# Patient Record
Sex: FEMALE | Race: WHITE | NOT HISPANIC OR LATINO | URBAN - METROPOLITAN AREA
[De-identification: names, ages, dates, MRNs, and addresses within clinical notes are randomized per-mention and may not be internally consistent; named-entity substitution may affect disease eponyms.]

---

## 2022-07-01 ENCOUNTER — OFFICE VISIT (OUTPATIENT)
Dept: URBAN - METROPOLITAN AREA CLINIC 81 | Facility: CLINIC | Age: 52
End: 2022-07-01
Payer: COMMERCIAL

## 2022-07-01 DIAGNOSIS — H52.4 PRESBYOPIA: ICD-10-CM

## 2022-07-01 DIAGNOSIS — H04.123 DRY EYE SYNDROME OF BILATERAL LACRIMAL GLANDS: Primary | ICD-10-CM

## 2022-07-01 PROCEDURE — 92002 INTRM OPH EXAM NEW PATIENT: CPT | Performed by: OPTOMETRIST

## 2022-07-01 RX ORDER — FLUOROMETHOLONE 1 MG/ML
0.1 % SOLUTION/ DROPS OPHTHALMIC
Qty: 5 | Refills: 1 | Status: ACTIVE
Start: 2022-07-01

## 2022-07-01 ASSESSMENT — KERATOMETRY
OD: 43.63
OS: 43.63

## 2022-07-01 ASSESSMENT — VISUAL ACUITY
OD: 20/20
OS: 20/25

## 2022-07-01 ASSESSMENT — INTRAOCULAR PRESSURE
OD: 18
OS: 18

## 2022-07-01 NOTE — IMPRESSION/PLAN
Impression: Dry eye syndrome of bilateral lacrimal glands: Q53.882. w/ allergic overlay. Plan: Discussed diagnosis in detail with patient. Dry eye accounts for the patient's symptoms. Dry eye is a chronic condition and does not have a cure and will need artificial tears for maintenance. Recommend Systane Complete or Refresh Relieva OU QID longterm. Ok to dispense samples. Start FML QID OU x 1 week then BID x 1 week. Glaucoma precautions reviewed. Pt to return if symptoms worsen. Monitor for changes.

## 2022-09-22 ENCOUNTER — OFFICE VISIT (OUTPATIENT)
Dept: URBAN - METROPOLITAN AREA CLINIC 81 | Facility: CLINIC | Age: 52
End: 2022-09-22
Payer: COMMERCIAL

## 2022-09-22 DIAGNOSIS — H04.123 DRY EYE SYNDROME OF BILATERAL LACRIMAL GLANDS: Primary | ICD-10-CM

## 2022-09-22 DIAGNOSIS — H10.45 OTHER CHRONIC ALLERGIC CONJUNCTIVITIS: ICD-10-CM

## 2022-09-22 PROCEDURE — 99213 OFFICE O/P EST LOW 20 MIN: CPT | Performed by: OPTOMETRIST

## 2022-09-22 RX ORDER — PREDNISOLONE ACETATE 10 MG/ML
1 % SUSPENSION/ DROPS OPHTHALMIC
Qty: 5 | Refills: 1 | Status: ACTIVE
Start: 2022-09-22

## 2022-09-22 RX ORDER — OLOPATADINE HYDROCHLORIDE OPHTHALMIC 1 MG/ML
0.1 % SOLUTION/ DROPS OPHTHALMIC
Qty: 5 | Refills: 6 | Status: ACTIVE
Start: 2022-09-22

## 2022-09-22 ASSESSMENT — INTRAOCULAR PRESSURE
OD: 16
OS: 16

## 2022-09-22 NOTE — IMPRESSION/PLAN
Impression: Dry eye syndrome of bilateral lacrimal glands: H04.123. w/ allergic overlay.
-no significant improvement with FML drop
-affecting daily life Plan: Discussed diagnosis in detail with patient. Dry eye accounts for the patient's symptoms. Dry eye is a chronic condition and does not have a cure and will need artificial tears for maintenance. Recommend Systane Complete or Refresh Relieva OU BID-QID longterm. Start Prednisolone OU QID x 1 week with weekly taper. Glaucoma precautions reviewed. Pt to return if symptoms worsen. Monitor for changes. If NB at f/up recommend punctal plugs or Restasis.

## 2022-09-22 NOTE — IMPRESSION/PLAN
Impression: Other chronic allergic conjunctivitis: H10.45. Bilateral. Plan: Discussed diagnosis with patient in detail. Start Olopatadine OU BID, OTC option of Pataday, can call for substitute if Olopatadine not covered by insurance. Will continue to observe condition and/or symptoms. Patient instructed to call if condition gets worse.

## 2022-10-20 ENCOUNTER — OFFICE VISIT (OUTPATIENT)
Dept: URBAN - METROPOLITAN AREA CLINIC 81 | Facility: CLINIC | Age: 52
End: 2022-10-20
Payer: COMMERCIAL

## 2022-10-20 DIAGNOSIS — H04.123 DRY EYE SYNDROME OF BILATERAL LACRIMAL GLANDS: Primary | ICD-10-CM

## 2022-10-20 DIAGNOSIS — H01.8 OTHER SPECIFIED INFLAMMATIONS OF EYELID: ICD-10-CM

## 2022-10-20 DIAGNOSIS — H10.45 OTHER CHRONIC ALLERGIC CONJUNCTIVITIS: ICD-10-CM

## 2022-10-20 PROCEDURE — 99214 OFFICE O/P EST MOD 30 MIN: CPT | Performed by: OPTOMETRIST

## 2022-10-20 RX ORDER — LOSARTAN POTASSIUM 100 MG/1
100 MG TABLET, FILM COATED ORAL
Qty: 0 | Refills: 0 | Status: ACTIVE
Start: 2022-10-20

## 2022-10-20 RX ORDER — NEOMYCIN SULFATE, POLYMYXIN B SULFATE AND DEXAMETHASONE 3.5; 10000; 1 MG/ML; [USP'U]/ML; MG/ML
SUSPENSION OPHTHALMIC
Qty: 5 | Refills: 1 | Status: ACTIVE
Start: 2022-10-20

## 2022-10-20 RX ORDER — OLOPATADINE HYDROCHLORIDE OPHTHALMIC 1 MG/ML
0.1 % SOLUTION/ DROPS OPHTHALMIC
Qty: 5 | Refills: 6 | Status: ACTIVE
Start: 2022-10-20

## 2022-10-20 ASSESSMENT — INTRAOCULAR PRESSURE
OS: 18
OD: 19

## 2022-10-20 NOTE — IMPRESSION/PLAN
Impression: Dry eye syndrome of bilateral lacrimal glands: H04.123. w/ allergic overlay.
-no improvement with FML gtt and Prednisolone gtt
-affecting daily life
-Thyroid related? dry, redness, eyelids feeling heavy Plan: Discussed diagnosis in detail with patient. Dry eye accounts for the patient's symptoms. Dry eye is a chronic condition and does not have a cure and will need artificial tears for maintenance. Recommend Systane Complete or Refresh Relieva OU BID-QID longterm. D/C Prednisolone gtt. Glaucoma precautions reviewed. See blepharitis plan. Pt to return if symptoms worsen. Monitor for changes. If NB at f/up recommend punctal plugs or Restasis. Recommend following up with PCP to monitor thyroid.

## 2022-10-20 NOTE — IMPRESSION/PLAN
Impression: Other specified inflammations of eyelid: H01.8. Bilateral. Plan: Blepharitis accounts for the patient's symptoms. Lid scrubs with diluted Mat Fuchs and Miah tear free baby shampoo as directed or Ocusoft lid scrubs allergy and monitor PRN. Start Maxitrol OU QID x 7 days then BID x 7 days.

## 2022-10-20 NOTE — IMPRESSION/PLAN
Impression: Other chronic allergic conjunctivitis: H10.45. Bilateral.
-no improvement Plan: Discussed diagnosis with patient in detail. Continue Olopatadine OU PRN. Will continue to observe condition and/or symptoms. Patient instructed to call if condition gets worse.

## 2022-11-03 ENCOUNTER — OFFICE VISIT (OUTPATIENT)
Dept: URBAN - METROPOLITAN AREA CLINIC 81 | Facility: CLINIC | Age: 52
End: 2022-11-03
Payer: COMMERCIAL

## 2022-11-03 DIAGNOSIS — H04.123 DRY EYE SYNDROME OF BILATERAL LACRIMAL GLANDS: Primary | ICD-10-CM

## 2022-11-03 DIAGNOSIS — H01.8 OTHER SPECIFIED INFLAMMATIONS OF EYELID: ICD-10-CM

## 2022-11-03 PROCEDURE — 99213 OFFICE O/P EST LOW 20 MIN: CPT | Performed by: OPTOMETRIST

## 2022-11-03 ASSESSMENT — INTRAOCULAR PRESSURE
OS: 22
OD: 22

## 2022-11-03 NOTE — IMPRESSION/PLAN
Impression: Other specified inflammations of eyelid: H01.8. Bilateral.
-mild improvement with Ocusoft lid scrubs Plan: Blepharitis accounts for the patient's symptoms. Lid scrubs Ocusoft lid scrubs allergy or trial Avenova spray and monitor PRN. Continue Maxitrol gtt OU QD x 5 days and D/C.

## 2022-11-03 NOTE — IMPRESSION/PLAN
Impression: Dry eye syndrome of bilateral lacrimal glands: H04.123. w/ allergic overlay.
-mild improvement with Maxitrol gtt
-no improvement with FML gtt and Prednisolone gtt
-affecting daily life
-Thyroid related? blood work performed and WNL
-Placed, plugs BLL. OD [place] size med 0.6-0.85mm log [id number] and OS [place] size Med 0.6-0.85 lot [id number]. Plan: Discussed diagnosis in detail with patient. Dry eye accounts for the patient's symptoms. Dry eye is a chronic condition and does not have a cure and will need artificial tears for maintenance. Recommend Refresh Digital or Refresh Relieva OU BID-QID longterm (samples dispensed). D/C Prednisolone gtt. Steroid precautions reviewed. See blepharitis plan. Pt to return if symptoms worsen. Monitor for changes. Recommend punctal plugs, patient agrees, verbal consent, place plug BLL today without complications, patient left the office in excellent condition.

## 2022-12-14 ENCOUNTER — OFFICE VISIT (OUTPATIENT)
Dept: URBAN - METROPOLITAN AREA CLINIC 81 | Facility: CLINIC | Age: 52
End: 2022-12-14
Payer: COMMERCIAL

## 2022-12-14 DIAGNOSIS — H04.123 DRY EYE SYNDROME OF BILATERAL LACRIMAL GLANDS: Primary | ICD-10-CM

## 2022-12-14 DIAGNOSIS — H10.45 OTHER CHRONIC ALLERGIC CONJUNCTIVITIS: ICD-10-CM

## 2022-12-14 DIAGNOSIS — H01.8 OTHER SPECIFIED INFLAMMATIONS OF EYELID: ICD-10-CM

## 2022-12-14 PROCEDURE — 99213 OFFICE O/P EST LOW 20 MIN: CPT | Performed by: OPTOMETRIST

## 2022-12-14 RX ORDER — KETOTIFEN FUMARATE 0.35 MG/ML
SOLUTION/ DROPS OPHTHALMIC
Qty: 5 | Refills: 4 | Status: ACTIVE
Start: 2022-12-14

## 2022-12-14 ASSESSMENT — INTRAOCULAR PRESSURE
OD: 21
OS: 21

## 2022-12-14 NOTE — IMPRESSION/PLAN
Impression: Dry eye syndrome of bilateral lacrimal glands: H04.123. w/ allergic overlay.
-mild improvement in symptoms with punctal plugs
-mild improvement with Maxitrol gtt
-no improvement with FML gtt and Prednisolone gtt
-affecting daily life
-Thyroid related? blood work performed and WNL
-11/03/2022 OD [place] size med 0.6-0.85mm log [id number] and OS [place] size Med 0.6-0.85 lot [id number]. Plan: Discussed diagnosis in detail with patient. Dry eye accounts for the patient's symptoms. Dry eye is a chronic condition and does not have a cure and will need artificial tears for maintenance. Continue Refresh Relieva OU QID longterm. Monitor for changes.

## 2022-12-14 NOTE — IMPRESSION/PLAN
Impression: Other chronic allergic conjunctivitis: H10.45. Bilateral.
-no improvement with Olopatadine gtt Plan: Discussed diagnosis with patient in detail. Start Ketotifen (Zaditor or Alaway brand names) OU BID. Patient instructed to call if condition gets worse.

## 2022-12-14 NOTE — IMPRESSION/PLAN
Impression: Other specified inflammations of eyelid: H01.8. Bilateral.
 Plan: Blepharitis accounts for the patient's symptoms. Lid scrubs Ocusoft lid scrubs allergy.

## 2023-03-30 ENCOUNTER — OFFICE VISIT (OUTPATIENT)
Dept: URBAN - METROPOLITAN AREA CLINIC 81 | Facility: CLINIC | Age: 53
End: 2023-03-30
Payer: COMMERCIAL

## 2023-03-30 DIAGNOSIS — H10.45 OTHER CHRONIC ALLERGIC CONJUNCTIVITIS: ICD-10-CM

## 2023-03-30 DIAGNOSIS — H04.123 DRY EYE SYNDROME OF BILATERAL LACRIMAL GLANDS: Primary | ICD-10-CM

## 2023-03-30 PROCEDURE — 99214 OFFICE O/P EST MOD 30 MIN: CPT | Performed by: OPTOMETRIST

## 2023-03-30 RX ORDER — CYCLOSPORINE 0.5 MG/ML
0.05 % EMULSION OPHTHALMIC
Qty: 180 | Refills: 3 | Status: INACTIVE
Start: 2023-03-30 | End: 2024-03-28

## 2023-03-30 ASSESSMENT — INTRAOCULAR PRESSURE
OD: 16
OS: 17

## 2023-03-30 NOTE — IMPRESSION/PLAN
Impression: Dry eye syndrome of bilateral lacrimal glands: H04.123. w/ allergic overlay.
-mild improvement in symptoms with punctal plugs
-mild improvement with Maxitrol gtt
-no improvement with FML gtt and Prednisolone gtt
-affecting daily life
-Patient has had blood work performed, thyroid testing was normal
-03/2022 OD [place] size med 0.6-0.85mm log [id number] and OS [place] size Med 0.6-0.85 lot [id number]. Plan: Discussed diagnosis in detail with patient. Dry eye accounts for the patient's symptoms. Dry eye is a chronic condition and does not have a cure and will need artificial tears for maintenance. Continue Refresh Relieva OU QID longterm. Start Restasis OU BID, dry eye affecting daily life, failed conservative management with different brands of AT, Prednisolone, Maxitrol gtt, and punctal plugs. Monitor for changes.

## 2023-03-30 NOTE — IMPRESSION/PLAN
Impression: Other chronic allergic conjunctivitis: H10.45. Bilateral.
-no improvement with Olopatadine gtt or Ketotifen gtt Plan: Discussed, monitor and treat dry eye.

## 2023-06-22 ENCOUNTER — OFFICE VISIT (OUTPATIENT)
Dept: URBAN - METROPOLITAN AREA CLINIC 81 | Facility: CLINIC | Age: 53
End: 2023-06-22
Payer: COMMERCIAL

## 2023-06-22 DIAGNOSIS — H53.143 VISUAL DISCOMFORT, BILATERAL: ICD-10-CM

## 2023-06-22 DIAGNOSIS — H04.123 DRY EYE SYNDROME OF BILATERAL LACRIMAL GLANDS: Primary | ICD-10-CM

## 2023-06-22 PROCEDURE — 99213 OFFICE O/P EST LOW 20 MIN: CPT | Performed by: OPTOMETRIST

## 2023-06-22 ASSESSMENT — INTRAOCULAR PRESSURE
OS: 17
OD: 15

## 2023-06-22 NOTE — IMPRESSION/PLAN
Impression: Visual discomfort, bilateral: H53.143.
-Pt has seen Neurologist 
-Pt was given lots of meds by neuro but nothing helped
-Pt had botox done to help with migraines
-Pt had to get medical card to help with headaches
-Triggered by lights Plan: Discussed.  Recommend to follow up with PCP to consider consult with Neurologist.

## 2023-06-22 NOTE — IMPRESSION/PLAN
Impression: Dry eye syndrome of bilateral lacrimal glands: F12.875. w/ allergic overlay.
-6/22/23 punctual plug in the LLL OS, no plug in the OD
-Restasis not covered by ins 
-mild improvement in symptoms with punctal plugs
-mild improvement with Maxitrol gtt
-no improvement with FML gtt and Prednisolone gtt
-affecting daily life
-Patient has had blood work performed, thyroid testing was normal
-03/2022 OD [place] size med 0.6-0.85mm log [id number] and OS [place] size Med 0.6-0.85 lot [id number]. Plan: Discussed diagnosis in detail with patient. Dry eye accounts for the patient's symptoms. Dry eye is a chronic condition and does not have a cure and will need artificial tears for maintenance. Continue Refresh Relieva OU QID longterm. Continue Klarity C, OU BID, dry eye affecting daily life, failed conservative management with different brands of AT, Prednisolone, Maxitrol gtt, and punctal plugs. Monitor for changes.

## 2025-06-27 ENCOUNTER — OFFICE VISIT (OUTPATIENT)
Dept: URBAN - METROPOLITAN AREA CLINIC 76 | Facility: CLINIC | Age: 55
End: 2025-06-27
Payer: COMMERCIAL

## 2025-06-27 DIAGNOSIS — H04.123 DRY EYE SYNDROME OF BILATERAL LACRIMAL GLANDS: Primary | ICD-10-CM

## 2025-06-27 DIAGNOSIS — H10.45 OTHER CHRONIC ALLERGIC CONJUNCTIVITIS: ICD-10-CM

## 2025-06-27 PROCEDURE — 99214 OFFICE O/P EST MOD 30 MIN: CPT | Performed by: OPTOMETRIST

## 2025-06-27 RX ORDER — DOXYCYCLINE HYCLATE 100 MG/1
100 MG TABLET, COATED ORAL
Qty: 30 | Refills: 0 | Status: ACTIVE
Start: 2025-06-27

## 2025-06-27 RX ORDER — CETIRIZINE HCL 1 MG/ML
10 MG SOLUTION, ORAL ORAL
Refills: 0 | Status: ACTIVE
Start: 2025-06-27

## 2025-06-27 RX ORDER — CETIRIZINE HCL 1 MG/ML
10 MG SOLUTION, ORAL ORAL
Refills: 0 | Status: INACTIVE
Start: 2025-06-27 | End: 2025-06-27

## 2025-06-27 ASSESSMENT — INTRAOCULAR PRESSURE
OD: 20
OS: 20

## 2025-06-27 ASSESSMENT — KERATOMETRY
OS: 43.88
OD: 44.00

## 2025-07-31 ENCOUNTER — OFFICE VISIT (OUTPATIENT)
Dept: URBAN - METROPOLITAN AREA CLINIC 76 | Facility: CLINIC | Age: 55
End: 2025-07-31
Payer: COMMERCIAL

## 2025-07-31 DIAGNOSIS — H04.123 DRY EYE SYNDROME OF BILATERAL LACRIMAL GLANDS: Primary | ICD-10-CM

## 2025-07-31 PROCEDURE — 99213 OFFICE O/P EST LOW 20 MIN: CPT | Performed by: OPTOMETRIST

## 2025-07-31 ASSESSMENT — INTRAOCULAR PRESSURE
OS: 19
OD: 17

## 2025-08-28 ENCOUNTER — OFFICE VISIT (OUTPATIENT)
Dept: URBAN - METROPOLITAN AREA CLINIC 76 | Facility: CLINIC | Age: 55
End: 2025-08-28
Payer: COMMERCIAL

## 2025-08-28 DIAGNOSIS — H10.45 OTHER CHRONIC ALLERGIC CONJUNCTIVITIS: ICD-10-CM

## 2025-08-28 DIAGNOSIS — H04.123 DRY EYE SYNDROME OF BILATERAL LACRIMAL GLANDS: Primary | ICD-10-CM

## 2025-08-28 PROCEDURE — 99213 OFFICE O/P EST LOW 20 MIN: CPT | Performed by: OPTOMETRIST

## 2025-08-28 RX ORDER — CYCLOSPORINE 0 G/ML
0.09 % SOLUTION/ DROPS OPHTHALMIC; TOPICAL
Qty: 0 | Refills: 0 | Status: ACTIVE
Start: 2025-08-28

## 2025-08-28 RX ORDER — VARENICLINE 0.03 MG/.05ML
SPRAY NASAL
Qty: 0 | Refills: 0 | Status: ACTIVE
Start: 2025-08-28

## 2025-08-28 ASSESSMENT — INTRAOCULAR PRESSURE
OS: 20
OD: 19